# Patient Record
Sex: MALE | Race: OTHER | HISPANIC OR LATINO | Employment: FULL TIME | URBAN - METROPOLITAN AREA
[De-identification: names, ages, dates, MRNs, and addresses within clinical notes are randomized per-mention and may not be internally consistent; named-entity substitution may affect disease eponyms.]

---

## 2022-09-17 ENCOUNTER — HOSPITAL ENCOUNTER (EMERGENCY)
Facility: HOSPITAL | Age: 47
Discharge: HOME | End: 2022-09-17
Attending: STUDENT IN AN ORGANIZED HEALTH CARE EDUCATION/TRAINING PROGRAM
Payer: COMMERCIAL

## 2022-09-17 VITALS
OXYGEN SATURATION: 99 % | TEMPERATURE: 99 F | HEIGHT: 68 IN | BODY MASS INDEX: 24.25 KG/M2 | SYSTOLIC BLOOD PRESSURE: 141 MMHG | RESPIRATION RATE: 18 BRPM | HEART RATE: 93 BPM | DIASTOLIC BLOOD PRESSURE: 65 MMHG | WEIGHT: 160 LBS
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
## 2022-09-17 DIAGNOSIS — S00.03XA HEMATOMA OF SCALP, INITIAL ENCOUNTER: ICD-10-CM

## 2022-09-17 DIAGNOSIS — S06.0X1A CONCUSSION WITH LOSS OF CONSCIOUSNESS OF 30 MINUTES OR LESS, INITIAL ENCOUNTER: ICD-10-CM

## 2022-09-17 PROCEDURE — 25000003 PHARM REV CODE 250: Performed by: PHYSICIAN ASSISTANT

## 2022-09-17 PROCEDURE — 99284 EMERGENCY DEPT VISIT MOD MDM: CPT | Mod: 25

## 2022-09-17 RX ORDER — METHOCARBAMOL 500 MG/1
1000 TABLET, FILM COATED ORAL 3 TIMES DAILY PRN
Qty: 20 TABLET | Refills: 0 | Status: SHIPPED | OUTPATIENT
Start: 2022-09-17 | End: 2022-09-22

## 2022-09-17 RX ORDER — ACETAMINOPHEN 500 MG
1000 TABLET ORAL
Status: COMPLETED | OUTPATIENT
Start: 2022-09-17 | End: 2022-09-17

## 2022-09-17 RX ORDER — ONDANSETRON 4 MG/1
4 TABLET, ORALLY DISINTEGRATING ORAL EVERY 6 HOURS PRN
Qty: 20 TABLET | Refills: 0 | Status: SHIPPED | OUTPATIENT
Start: 2022-09-17

## 2022-09-17 RX ORDER — METHOCARBAMOL 500 MG/1
1000 TABLET, FILM COATED ORAL
Status: COMPLETED | OUTPATIENT
Start: 2022-09-17 | End: 2022-09-17

## 2022-09-17 RX ADMIN — ACETAMINOPHEN 1000 MG: 500 TABLET ORAL at 06:09

## 2022-09-17 RX ADMIN — METHOCARBAMOL 1000 MG: 500 TABLET ORAL at 06:09

## 2022-09-17 NOTE — DISCHARGE INSTRUCTIONS
Tylenol según sea necesario para el dolor de allison. Robaxin para rigidez/dolor. Tenga en cuenta que fabio medicamento es sedante. No mezclar con alcohol ni con ningún otro medicamento sedante. No conduzca ni maneje maquinaria mientras esté tomando fabio medicamento. Hielo en las áreas de hinchazón/traumatismo. Zofran según sea necesario para las náuseas. Descansa ml. Sin levantar objetos pesados. Sin ejercicio extenuante. Ninguna actividad que requiera mucha concentración. Regrese a fabio servicio de urgencias si comienza con dolor de allison intenso, vómitos frecuentes, debilidad en brazos o piernas, si siente mareos persistentes o si presenta cualquier otro problema.    Tylenol as needed for headache. Robaxin for stiffness/soreness. Be aware, this medication is sedating.  Do not mix with alcohol or any other sedating medications.  Do not drive or operate machinery when taking this medication. Ice to the areas of swelling/trauma. Zofran as needed for nausea. Get some good rest. No heavy lifting. No strenuous exercise. No activity which requires a lot of focus. Return to this ED if you begin with severe headache, frequent vomiting, arm or leg weakness, if you feel persistent lightheadedness or dizziness, if any other problems occur.
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
